# Patient Record
Sex: FEMALE
[De-identification: names, ages, dates, MRNs, and addresses within clinical notes are randomized per-mention and may not be internally consistent; named-entity substitution may affect disease eponyms.]

---

## 2020-06-29 ENCOUNTER — NURSE TRIAGE (OUTPATIENT)
Dept: OTHER | Facility: CLINIC | Age: 55
End: 2020-06-29

## 2020-06-29 NOTE — TELEPHONE ENCOUNTER
Reason for Disposition   Caller has URGENT medication question about med that PCP prescribed and triager unable to answer question    Answer Assessment - Initial Assessment Questions  1. SYMPTOMS: \"Do you have any symptoms? \"      no  2. SEVERITY: If symptoms are present, ask \"Are they mild, moderate or severe? \"      Not yet    Protocols used: MEDICATION QUESTION CALL-ADULT-[place]    Caller took dogs Apoquel by accident. I transferred to [facility]. [phone]. Caller had no further questions.

## 2023-03-07 ENCOUNTER — TELEPHONE (OUTPATIENT)
Dept: PRIMARY CARE | Facility: CLINIC | Age: 58
End: 2023-03-07
Payer: COMMERCIAL

## 2023-03-07 LAB
ALANINE AMINOTRANSFERASE (SGPT) (U/L) IN SER/PLAS: 23 U/L (ref 7–45)
ALBUMIN (G/DL) IN SER/PLAS: 4.6 G/DL (ref 3.4–5)
ALKALINE PHOSPHATASE (U/L) IN SER/PLAS: 68 U/L (ref 33–110)
ANION GAP IN SER/PLAS: 8 MMOL/L (ref 10–20)
ASPARTATE AMINOTRANSFERASE (SGOT) (U/L) IN SER/PLAS: 23 U/L (ref 9–39)
BASOPHILS (10*3/UL) IN BLOOD BY AUTOMATED COUNT: 0.04 X10E9/L (ref 0–0.1)
BASOPHILS/100 LEUKOCYTES IN BLOOD BY AUTOMATED COUNT: 0.6 % (ref 0–2)
BILIRUBIN TOTAL (MG/DL) IN SER/PLAS: 1 MG/DL (ref 0–1.2)
CALCIUM (MG/DL) IN SER/PLAS: 9.6 MG/DL (ref 8.6–10.3)
CARBON DIOXIDE, TOTAL (MMOL/L) IN SER/PLAS: 30 MMOL/L (ref 21–32)
CHLORIDE (MMOL/L) IN SER/PLAS: 104 MMOL/L (ref 98–107)
CHOLESTEROL (MG/DL) IN SER/PLAS: 213 MG/DL (ref 0–199)
CHOLESTEROL IN HDL (MG/DL) IN SER/PLAS: 39.7 MG/DL
CHOLESTEROL/HDL RATIO: 5.4
CREATININE (MG/DL) IN SER/PLAS: 0.83 MG/DL (ref 0.5–1.05)
ERYTHROCYTE DISTRIBUTION WIDTH (RATIO) BY AUTOMATED COUNT: 13.2 % (ref 11.5–14.5)
ERYTHROCYTE MEAN CORPUSCULAR HEMOGLOBIN CONCENTRATION (G/DL) BY AUTOMATED: 32.4 G/DL (ref 32–36)
ERYTHROCYTE MEAN CORPUSCULAR VOLUME (FL) BY AUTOMATED COUNT: 89 FL (ref 80–100)
ERYTHROCYTES (10*6/UL) IN BLOOD BY AUTOMATED COUNT: 5.15 X10E12/L (ref 4–5.2)
GFR FEMALE: 82 ML/MIN/1.73M2
GLUCOSE (MG/DL) IN SER/PLAS: 84 MG/DL (ref 74–99)
HEMATOCRIT (%) IN BLOOD BY AUTOMATED COUNT: 45.7 % (ref 36–46)
HEMOGLOBIN (G/DL) IN BLOOD: 14.8 G/DL (ref 12–16)
IMMATURE GRANULOCYTES/100 LEUKOCYTES IN BLOOD BY AUTOMATED COUNT: 0.3 % (ref 0–0.9)
LDL: 136 MG/DL (ref 0–99)
LEUKOCYTES (10*3/UL) IN BLOOD BY AUTOMATED COUNT: 7.1 X10E9/L (ref 4.4–11.3)
LYMPHOCYTES (10*3/UL) IN BLOOD BY AUTOMATED COUNT: 1.28 X10E9/L (ref 1.2–4.8)
LYMPHOCYTES/100 LEUKOCYTES IN BLOOD BY AUTOMATED COUNT: 18 % (ref 13–44)
MONOCYTES (10*3/UL) IN BLOOD BY AUTOMATED COUNT: 0.51 X10E9/L (ref 0.1–1)
MONOCYTES/100 LEUKOCYTES IN BLOOD BY AUTOMATED COUNT: 7.2 % (ref 2–10)
NEUTROPHILS (10*3/UL) IN BLOOD BY AUTOMATED COUNT: 5.28 X10E9/L (ref 1.2–7.7)
NEUTROPHILS/100 LEUKOCYTES IN BLOOD BY AUTOMATED COUNT: 73.9 % (ref 40–80)
PLATELETS (10*3/UL) IN BLOOD AUTOMATED COUNT: 360 X10E9/L (ref 150–450)
POTASSIUM (MMOL/L) IN SER/PLAS: 4.3 MMOL/L (ref 3.5–5.3)
PROTEIN TOTAL: 7.5 G/DL (ref 6.4–8.2)
SODIUM (MMOL/L) IN SER/PLAS: 138 MMOL/L (ref 136–145)
TRIGLYCERIDE (MG/DL) IN SER/PLAS: 185 MG/DL (ref 0–149)
UREA NITROGEN (MG/DL) IN SER/PLAS: 14 MG/DL (ref 6–23)
VLDL: 37 MG/DL (ref 0–40)

## 2023-03-07 NOTE — TELEPHONE ENCOUNTER
----- Message from Luis Chanel MD sent at 3/7/2023  1:31 PM EST -----  Please let patient know that her blood work showed her cholesterol was worse than last time.  Otherwise everything else looked normal.  If she could try to eat a lower fat diet and eat less processed foods therapy  ----- Message -----  From: [facility] - Lab Results In  Sent: 3/7/2023  12:25 PM EST  To: Luis Chanel MD

## 2023-07-17 PROBLEM — R87.612 LOW GRADE SQUAMOUS INTRAEPITHELIAL LESION (LGSIL) AT RISK FOR HIGH GRADE SQUAMOUS INTRAEPITHELIAL LESION (HGSIL) ON CYTOLOGIC SMEAR OF CERVIX: Status: ACTIVE | Noted: 2023-07-17

## 2023-07-19 ENCOUNTER — LAB (OUTPATIENT)
Dept: LAB | Facility: LAB | Age: 58
End: 2023-07-19
Payer: COMMERCIAL

## 2023-07-19 ENCOUNTER — OFFICE VISIT (OUTPATIENT)
Dept: PRIMARY CARE | Facility: CLINIC | Age: 58
End: 2023-07-19
Payer: COMMERCIAL

## 2023-07-19 VITALS
SYSTOLIC BLOOD PRESSURE: 122 MMHG | TEMPERATURE: 97.6 F | BODY MASS INDEX: 34.95 KG/M2 | WEIGHT: 210 LBS | DIASTOLIC BLOOD PRESSURE: 74 MMHG | HEART RATE: 73 BPM | OXYGEN SATURATION: 98 %

## 2023-07-19 DIAGNOSIS — R59.0 LOCALIZED ENLARGED LYMPH NODES: ICD-10-CM

## 2023-07-19 DIAGNOSIS — R53.83 FATIGUE, UNSPECIFIED TYPE: ICD-10-CM

## 2023-07-19 DIAGNOSIS — Z72.820 SLEEP DEFICIENT: ICD-10-CM

## 2023-07-19 DIAGNOSIS — R53.83 FATIGUE, UNSPECIFIED TYPE: Primary | ICD-10-CM

## 2023-07-19 PROBLEM — L65.9 ALOPECIA: Status: ACTIVE | Noted: 2022-09-07

## 2023-07-19 PROBLEM — E66.9 OBESITY: Status: ACTIVE | Noted: 2022-09-07

## 2023-07-19 LAB
ALANINE AMINOTRANSFERASE (SGPT) (U/L) IN SER/PLAS: 26 U/L (ref 7–45)
ALBUMIN (G/DL) IN SER/PLAS: 4.5 G/DL (ref 3.4–5)
ALKALINE PHOSPHATASE (U/L) IN SER/PLAS: 79 U/L (ref 33–110)
ANION GAP IN SER/PLAS: 11 MMOL/L (ref 10–20)
ASPARTATE AMINOTRANSFERASE (SGOT) (U/L) IN SER/PLAS: 24 U/L (ref 9–39)
BILIRUBIN TOTAL (MG/DL) IN SER/PLAS: 0.7 MG/DL (ref 0–1.2)
CALCIUM (MG/DL) IN SER/PLAS: 9.5 MG/DL (ref 8.6–10.3)
CARBON DIOXIDE, TOTAL (MMOL/L) IN SER/PLAS: 28 MMOL/L (ref 21–32)
CHLORIDE (MMOL/L) IN SER/PLAS: 104 MMOL/L (ref 98–107)
COBALAMIN (VITAMIN B12) (PG/ML) IN SER/PLAS: 315 PG/ML (ref 211–911)
CREATININE (MG/DL) IN SER/PLAS: 0.76 MG/DL (ref 0.5–1.05)
ERYTHROCYTE DISTRIBUTION WIDTH (RATIO) BY AUTOMATED COUNT: 13.4 % (ref 11.5–14.5)
ERYTHROCYTE MEAN CORPUSCULAR HEMOGLOBIN CONCENTRATION (G/DL) BY AUTOMATED: 33.1 G/DL (ref 32–36)
ERYTHROCYTE MEAN CORPUSCULAR VOLUME (FL) BY AUTOMATED COUNT: 89 FL (ref 80–100)
ERYTHROCYTES (10*6/UL) IN BLOOD BY AUTOMATED COUNT: 4.93 X10E12/L (ref 4–5.2)
FERRITIN (UG/LL) IN SER/PLAS: 93 UG/L (ref 8–150)
GFR FEMALE: >90 ML/MIN/1.73M2
GLUCOSE (MG/DL) IN SER/PLAS: 85 MG/DL (ref 74–99)
HEMATOCRIT (%) IN BLOOD BY AUTOMATED COUNT: 44.1 % (ref 36–46)
HEMOGLOBIN (G/DL) IN BLOOD: 14.6 G/DL (ref 12–16)
IRON (UG/DL) IN SER/PLAS: 97 UG/DL (ref 35–150)
IRON BINDING CAPACITY (UG/DL) IN SER/PLAS: 412 UG/DL (ref 240–445)
IRON SATURATION (%) IN SER/PLAS: 24 % (ref 25–45)
LEUKOCYTES (10*3/UL) IN BLOOD BY AUTOMATED COUNT: 8 X10E9/L (ref 4.4–11.3)
PLATELETS (10*3/UL) IN BLOOD AUTOMATED COUNT: 396 X10E9/L (ref 150–450)
POTASSIUM (MMOL/L) IN SER/PLAS: 4.5 MMOL/L (ref 3.5–5.3)
PROTEIN TOTAL: 7.1 G/DL (ref 6.4–8.2)
SODIUM (MMOL/L) IN SER/PLAS: 138 MMOL/L (ref 136–145)
THYROTROPIN (MIU/L) IN SER/PLAS BY DETECTION LIMIT <= 0.05 MIU/L: 1.06 MIU/L (ref 0.44–3.98)
UREA NITROGEN (MG/DL) IN SER/PLAS: 18 MG/DL (ref 6–23)

## 2023-07-19 PROCEDURE — 36415 COLL VENOUS BLD VENIPUNCTURE: CPT

## 2023-07-19 PROCEDURE — 1036F TOBACCO NON-USER: CPT | Performed by: FAMILY MEDICINE

## 2023-07-19 PROCEDURE — 82607 VITAMIN B-12: CPT

## 2023-07-19 PROCEDURE — 83550 IRON BINDING TEST: CPT

## 2023-07-19 PROCEDURE — 99214 OFFICE O/P EST MOD 30 MIN: CPT | Performed by: FAMILY MEDICINE

## 2023-07-19 PROCEDURE — 82652 VIT D 1 25-DIHYDROXY: CPT

## 2023-07-19 PROCEDURE — 82728 ASSAY OF FERRITIN: CPT

## 2023-07-19 PROCEDURE — 80053 COMPREHEN METABOLIC PANEL: CPT

## 2023-07-19 PROCEDURE — 85027 COMPLETE CBC AUTOMATED: CPT

## 2023-07-19 PROCEDURE — 83540 ASSAY OF IRON: CPT

## 2023-07-19 PROCEDURE — 84443 ASSAY THYROID STIM HORMONE: CPT

## 2023-07-19 RX ORDER — CALCIUM CARBONATE/VITAMIN D3 600 MG-10
1 TABLET ORAL DAILY
COMMUNITY

## 2023-07-19 RX ORDER — SPIRONOLACTONE 50 MG/1
TABLET, FILM COATED ORAL
COMMUNITY
Start: 2016-06-29

## 2023-07-19 RX ORDER — FLUTICASONE PROPIONATE 50 MCG
SPRAY, SUSPENSION (ML) NASAL
COMMUNITY
Start: 2022-02-08

## 2023-07-19 RX ORDER — MINOXIDIL 50 MG/G
AEROSOL, FOAM TOPICAL
COMMUNITY

## 2023-07-19 ASSESSMENT — ENCOUNTER SYMPTOMS
SHORTNESS OF BREATH: 0
CHILLS: 0
FEVER: 0
DYSURIA: 0
COUGH: 0
FATIGUE: 1

## 2023-07-19 NOTE — PROGRESS NOTES
Subjective   Patient ID: Bria Pedro is a 58 y.o. female who presents for Fatigue (Extreme tiredness x 3 months).    Bria presents to discuss fatigue and abnormal findings on the chest CT.    She reports persistent fatigue over the last few months.  She did have removal of both her ovaries in April.  Feels tired most days.  Is sleeping about 5 hours per night.  Energy levels are lower than normal.    Recently she had a CT of her chest which showed enlarged lymph nodes in the mediastinal area.  She is not having any symptoms of cough, shortness of breath, or chest pain.  Was recommended to see hematology by the lung specialist however she has not made the appointment yet.           Review of Systems   Constitutional:  Positive for fatigue. Negative for chills and fever.   Respiratory:  Negative for cough and shortness of breath.    Cardiovascular:  Negative for chest pain.   Genitourinary:  Negative for dysuria.       Objective   /74   Pulse 73   Temp 36.4 °C (97.6 °F)   Wt 95.3 kg (210 lb)   SpO2 98%   BMI 34.95 kg/m²     Physical Exam  Constitutional:       General: She is not in acute distress.     Appearance: Normal appearance.   HENT:      Head: Normocephalic.   Pulmonary:      Effort: Pulmonary effort is normal.   Neurological:      General: No focal deficit present.      Mental Status: She is alert.   Psychiatric:         Mood and Affect: Mood normal.         Assessment/Plan   Diagnoses and all orders for this visit:  Fatigue, unspecified type  Comments:  Possibly related to sleep deficiency.  Discussed changing sleep behaviorto get 7-9 hours of sleep per night.  Check labs to assess for underlying cause.  Orders:  -     CBC; Future  -     Comprehensive Metabolic Panel; Future  -     Ferritin; Future  -     Iron and TIBC; Future  -     TSH with reflex to Free T4 if abnormal; Future  -     Vitamin B12; Future  -     Vitamin D 1,25 Dihydroxy; Future  Sleep deficient  Localized enlarged lymph  nodes  Comments:  Mediastinal lymph nodes are stable on CT.  Referred to hematology oncology to see if further evaluation is warranted and recommended follow-up.  Orders:  -     Referral to Hematology; Future

## 2023-07-22 LAB — VITAMIN D 1,25-DIHYDROXY: 89.2 PG/ML (ref 19.9–79.3)

## 2023-07-28 ENCOUNTER — TELEPHONE (OUTPATIENT)
Dept: PRIMARY CARE | Facility: CLINIC | Age: 58
End: 2023-07-28
Payer: COMMERCIAL

## 2023-10-17 ENCOUNTER — NUTRITION (OUTPATIENT)
Dept: NUTRITION | Facility: CLINIC | Age: 58
End: 2023-10-17
Payer: COMMERCIAL

## 2023-10-17 DIAGNOSIS — E66.9 OBESITY, UNSPECIFIED CLASSIFICATION, UNSPECIFIED OBESITY TYPE, UNSPECIFIED WHETHER SERIOUS COMORBIDITY PRESENT: Primary | ICD-10-CM

## 2023-10-17 PROCEDURE — 97802 MEDICAL NUTRITION INDIV IN: CPT

## 2023-10-17 NOTE — PROGRESS NOTES
NUTRITION ASSESSMENT NOTE    Reason for Nutrition Visit:  Pt is a 58 y.o. female being seen in person for an initial appointment at [facility] referred for obesity and BMI of 34.0-34.9. Pt states they seek  from dietitian for help losing wt. Pt realizes that the things she used to do to lose and manage wt status are no longer working for her.     Anthropometrics:  Today's wt: 206#  IBW: 125#/56.8kg  % IBW: 164%  Significant wt change: No      Past Medical Hx:  Patient Active Problem List   Diagnosis    Low grade squamous intraepithelial lesion (LGSIL) at risk for high grade squamous intraepithelial lesion (HGSIL) on cytologic smear of cervix    Alopecia    Obesity          Lab Results   Component Value Date    CHOL 213 (H) 03/07/2023    LDLF 136 (H) 03/07/2023    TRIG 185 (H) 03/07/2023          Food and Nutrition Hx:  Pt does not eat a lot of red meat, however she does consume a great deal of cheese - consumption daily in nature.      DIETARY RECALL:  Wake-up: 5:30am  Meal 1: Everyday, ~7am, weekdays - smoothie with protein powder/spinach x 2 handfuls/blueberries x 1 C/flaxseed x 1 tbsp; weekends - scrambled eggs, omelette with vegetables, oatmeal with blueberries/banana/walnuts  Meal 2: Everyday, ~12:30pm, salads, leftovers, black bean burger, tuna fish, chicken  Meal 3: Everyday, ~6pm/7:30pm, chicken (roasted), soup (chicken tortilla), roasted pork loin, turkey stuffed peppers with mashed potatoes, baked potatoes   Snacks: Tortilla chips with cheese and salsa  Bedtime: ~11pm  Beverages: Water x ~60+oz daily, carbonated water, almond milk, unsweetened iced tea      NUTRITIONAL ARTIFACTS:  Pt does not consume caffeine -- pt was gluten free x 1 year a few years ago, she felt great -- owns and uses a [employer] -- belongs to [employer] and uses for groceries -- does not drink pops/sodas    Allergies: None  Intolerance: None  Appetite: Normal  Intake: >75%  GI Symptoms : constipation Frequency: occasional  Swallowing  Difficulty: No problems with swallowing  Dentition : own    Types of Activities: HIT training  Duration: 1-2 hours every 2 days    Sleep duration/quality : 5 hours, not continuous or quality in nature  Sleep disorders: none    Supplements: Multivitamin and Fish Oil daily    Feelings of Hunger?: Doesn't notice - Pt does eat out of habit, but asks herself if she is really hungry  Physical Feeling: Stuffed  Cravings: Sweet  Energy Levels: Stable    Food Preparation: Patient  Pt likes to cook, feels skilled   Grocery Shopping: Patient    Eating Out Type: Lunch       Nutrition Focused Physical Exam:    Performed/Deferred: Deferred as pt visually appears well-nourished with no signs of malnutrition      Estimated Energy Needs:    WEIGHT MAINTENANCE Needs: MSJ, 1526 x 1.3 (AF) - 2000 kcals/day  WEIGHT LOSS Needs: 25-30 kcals/kg/IBW = 0723-9998 kcals/day  Protein Needs: 0.8-1 g/kg/CBW = 75-95 g/protein/day      Nutrition Diagnosis:    Diagnosis Statement 1:  Diagnosis Status: New  Diagnosis : Inadequate fiber intake  related to food and nutrition related knowledge deficit concerning desirable quantities of fiber as evidenced by estimated intake of fiber that is insufficient when compared to recommended amounts (38 g/day for men and 25 g/day for women)    Diagnosis Statement 2:  Diagnosis Status: New  Diagnosis : Altered nutrition related lab values  related to  endocrine and/or metabolic dysfunction  as evidenced by  total cholesterol of 213, LDL of 136, and TG of 185    Diagnosis Statement 3:   Diagnosis Status: New  Diagnosis : Food and nutrition related knowledge deficit related to lack of or limited prior nutrition-related education as evidenced by verbalizes inaccurate or incomplete knowledge    Nutrition Interventions:  Decreased Carbohydrate Diet, Decreased Fat Diet, Increased Fiber Diet, Increased Soluble Fiber, Increased Omega-3 Diet, Increased Protein Diet, Low Saturated Fat Diet, Mediterranean Diet, and Plant  Based Diet  Nutrition Counseling: Motivational Interviewing and Goal Setting  Coordination of Care: None    Nutrition Goals:  Nutrition Goals: Adequate fluid intake: 80oz+ water daily  Carbohydrate consistency  Consistent meal/snack pattern  Decrease intake of added sugars  Decrease intake of saturated fats  Increase awareness and respond to satiety cues  Lab values within normal limits  Reduce LDL level  Reduce Triglyceride level  Weight Loss  Vegetables: Increase  Whole Grains: Increase  Seafood/fish: Increase    Nutrition Recommendations:  1) Consider following a Mediterranean approach to your dietary pattern to help improve health and reduce risk/manage chronic disease. The Mediterranean dietary pattern focuses on eating more unprocessed foods with frequent and regular consumption of fresh fruits, fresh vegetables, whole grains, legumes/beans, nuts, fish and seafood, poultry, eggs, low fat cheese and dairy (cottage cheese, Greek yogurt, and kefir), and use heart healthy oils like cold-pressed extra-virgin olive oil and minimally refined avocado oil. Include variety and color to match the rainbow within your diet by opting for fresh or frozen varieties of fruits and vegetables that you can regularly include at your meals and snacks.  2) Eat more fresh or canned fish, shellfish, and mollusks including shellfish, salmon, tuna, sardines, trout, anchovies, halibut, Susan Susan, perch, walleye, sea bass, herring, shrimp, crab, oysters, mussels, and clams several times a week. These contain beneficial omega-3 fatty acids, protein, iron, and other vitamins and minerals while boasting low saturated fat content.  3) Choose more whole grains for their added fiber and nutrient content. Whole grains include 100% whole wheat products, amaranth, barley, buckwheat, bulgur, einkorn, farro, fonio, freekah, kamut, kaniwa, millet, oats, quinoa, rice (brown, wild, black, red), rye, sorghum, spelt, and teff.   4) Increase fibrous  vegetable intake. Women are recommended to consume at least 25 grams of fiber daily. Plants, such as vegetables, fruits, whole grains, legumes, nuts, and seeds will help reduce inflammation in your GI tract, help repair and restore lining and integrity of GI tract, manage blood sugar levels, reduce cholesterol and lipid levels, enhance metabolic functioning, improve weight status, and promote satiety. Fiber from plants does not count as calories, and is considered extremely healthful.    Educational Handouts: ADA My Plate, Plant-Based Plate, Whole Grains A-Z, ACLM Nourish Bowls, DGDD, 30DMDMP    Readiness to Change : Good  Level of Understanding : Good  Anticipated Compliant : Good

## 2023-12-12 ENCOUNTER — APPOINTMENT (OUTPATIENT)
Dept: NUTRITION | Facility: CLINIC | Age: 58
End: 2023-12-12
Payer: COMMERCIAL

## 2024-05-07 ENCOUNTER — OFFICE VISIT (OUTPATIENT)
Dept: PRIMARY CARE | Facility: CLINIC | Age: 59
End: 2024-05-07
Payer: COMMERCIAL

## 2024-05-07 VITALS
DIASTOLIC BLOOD PRESSURE: 84 MMHG | BODY MASS INDEX: 35.49 KG/M2 | HEART RATE: 88 BPM | WEIGHT: 213 LBS | HEIGHT: 65 IN | OXYGEN SATURATION: 98 % | SYSTOLIC BLOOD PRESSURE: 122 MMHG | TEMPERATURE: 98.4 F

## 2024-05-07 DIAGNOSIS — Z00.00 ROUTINE ADULT HEALTH MAINTENANCE: Primary | ICD-10-CM

## 2024-05-07 DIAGNOSIS — Z12.11 SCREENING FOR MALIGNANT NEOPLASM OF COLON: ICD-10-CM

## 2024-05-07 PROBLEM — E78.5 HYPERLIPIDEMIA: Status: ACTIVE | Noted: 2024-05-07

## 2024-05-07 PROBLEM — F41.9 ANXIETY: Status: ACTIVE | Noted: 2024-05-07

## 2024-05-07 PROBLEM — Z86.010 HX OF COLONIC POLYP: Status: ACTIVE | Noted: 2018-09-19

## 2024-05-07 PROBLEM — Z86.0100 HX OF COLONIC POLYP: Status: ACTIVE | Noted: 2018-09-19

## 2024-05-07 PROBLEM — E04.1 THYROID NODULE: Status: ACTIVE | Noted: 2024-05-07

## 2024-05-07 PROCEDURE — 1036F TOBACCO NON-USER: CPT | Performed by: FAMILY MEDICINE

## 2024-05-07 PROCEDURE — 3008F BODY MASS INDEX DOCD: CPT | Performed by: FAMILY MEDICINE

## 2024-05-07 PROCEDURE — 99396 PREV VISIT EST AGE 40-64: CPT | Performed by: FAMILY MEDICINE

## 2024-05-07 RX ORDER — BISMUTH SUBSALICYLATE 262 MG
1 TABLET,CHEWABLE ORAL DAILY
COMMUNITY

## 2024-05-07 RX ORDER — FEXOFENADINE HCL 60 MG
60 TABLET ORAL DAILY
COMMUNITY

## 2024-05-07 ASSESSMENT — PATIENT HEALTH QUESTIONNAIRE - PHQ9
1. LITTLE INTEREST OR PLEASURE IN DOING THINGS: NOT AT ALL
SUM OF ALL RESPONSES TO PHQ9 QUESTIONS 1 AND 2: 0
2. FEELING DOWN, DEPRESSED OR HOPELESS: NOT AT ALL

## 2024-05-07 NOTE — PROGRESS NOTES
Subjective   Patient ID: Bria Pedro is a 58 y.o. female who presents for Annual Exam.  HPI patient reports that she is doing well.  She is studying for exam for her work which is stressing her out some.  Her weight has been stable although she is been try lose weight and is frustrated by it.  She does not want to take any medications for weight loss.  She is due for colonoscopy and needs another CT scan of her lung ordered.  She had mammograms done earlier in the year.    Patient Active Problem List   Diagnosis    Low grade squamous intraepithelial lesion (LGSIL) at risk for high grade squamous intraepithelial lesion (HGSIL) on cytologic smear of cervix    Alopecia    Obesity    Anxiety    Hyperlipidemia    Hx of colonic polyp    Thyroid nodule       Social Connections: Not on file       Current Outpatient Medications on File Prior to Visit   Medication Sig Dispense Refill    fexofenadine (Allegra) 60 mg tablet Take 1 tablet (60 mg) by mouth once daily.      fluticasone (Flonase) 50 mcg/actuation nasal spray Administer into affected nostril(s).      minoxidiL 5 % foam APPLY TO HEAD DAILY      multivitamin tablet Take 1 tablet by mouth once daily.      omega-3 fatty acids-fish oil (One-Per-Day Omega-3) 684-1,200 mg capsule Take 1 capsule (1,200 mg) by mouth once daily.      spironolactone (Aldactone) 50 mg tablet Take by mouth.       No current facility-administered medications on file prior to visit.        Vitals:    05/07/24 1419   BP: 122/84   Pulse: 88   Temp: 36.9 °C (98.4 °F)   SpO2: 98%     Vitals:    05/07/24 1419   Weight: 96.6 kg (213 lb)       Review of Systems   All other systems reviewed and are negative.      Objective     Physical Exam  Vitals reviewed.   Constitutional:       General: She is not in acute distress.     Appearance: Normal appearance. She is well-developed. She is not diaphoretic.   HENT:      Head: Normocephalic and atraumatic.      Right Ear: Tympanic membrane normal.      Left  Ear: Tympanic membrane normal.      Nose: Nose normal.      Mouth/Throat:      Mouth: Mucous membranes are moist.   Eyes:      Pupils: Pupils are equal, round, and reactive to light.   Cardiovascular:      Rate and Rhythm: Normal rate and regular rhythm.      Heart sounds: Normal heart sounds. No murmur heard.     No friction rub. No gallop.   Pulmonary:      Effort: Pulmonary effort is normal.      Breath sounds: Normal breath sounds. No rales.   Abdominal:      General: Bowel sounds are normal.      Palpations: Abdomen is soft.      Tenderness: There is no abdominal tenderness.   Musculoskeletal:      Cervical back: Normal range of motion and neck supple.   Skin:     General: Skin is warm and dry.   Neurological:      Mental Status: She is alert.   Psychiatric:         Mood and Affect: Mood normal.         No visits with results within 2 Month(s) from this visit.   Latest known visit with results is:   Legacy Encounter on 08/03/2023   Component Date Value Ref Range Status    LD 08/03/2023 78 (L)  84 - 246 U/L Final    Glucose 08/03/2023 85  74 - 99 mg/dL Final    Sodium 08/03/2023 140  136 - 145 mmol/L Final    Potassium 08/03/2023 4.0  3.5 - 5.3 mmol/L Final    Chloride 08/03/2023 106  98 - 107 mmol/L Final    Bicarbonate 08/03/2023 27  21 - 32 mmol/L Final    Anion Gap 08/03/2023 11  10 - 20 mmol/L Final    Urea Nitrogen 08/03/2023 15  6 - 23 mg/dL Final    Creatinine 08/03/2023 0.77  0.50 - 1.05 mg/dL Final    GFR Female 08/03/2023 90  >90 mL/min/1.73m2 Final    Comment:  CALCULATIONS OF ESTIMATED GFR ARE PERFORMED   USING THE 2021 CKD-EPI STUDY REFIT EQUATION   WITHOUT THE RACE VARIABLE FOR THE IDMS-TRACEABLE   CREATININE METHODS.    https://jasn.asnjournals.org/content/early/2021/09/22/ASN.7324206691      Calcium 08/03/2023 9.5  8.6 - 10.3 mg/dL Final    Albumin 08/03/2023 4.5  3.4 - 5.0 g/dL Final    Alkaline Phosphatase 08/03/2023 73  33 - 110 U/L Final    Total Protein 08/03/2023 7.2  6.4 - 8.2 g/dL Final     AST 08/03/2023 27  9 - 39 U/L Final    Total Bilirubin 08/03/2023 0.6  0.0 - 1.2 mg/dL Final    ALT (SGPT) 08/03/2023 24  7 - 45 U/L Final    Comment:  Patients treated with Sulfasalazine may generate    falsely decreased results for ALT.      WBC 08/03/2023 6.9  4.4 - 11.3 x10E9/L Final    RBC 08/03/2023 4.79  4.00 - 5.20 x10E12/L Final    Hemoglobin 08/03/2023 14.3  12.0 - 16.0 g/dL Final    Hematocrit 08/03/2023 42.2  36.0 - 46.0 % Final    MCV 08/03/2023 88  80 - 100 fL Final    MCHC 08/03/2023 33.9  32.0 - 36.0 g/dL Final    Platelets 08/03/2023 322  150 - 450 x10E9/L Final    RDW 08/03/2023 13.2  11.5 - 14.5 % Final    Neutrophils % 08/03/2023 75.4  40.0 - 80.0 % Final    Immature Granulocytes %, Automated 08/03/2023 0.3  0.0 - 0.9 % Final    Comment:  Immature Granulocyte Count (IG) includes promyelocytes,    myelocytes and metamyelocytes but does not include bands.   Percent differential counts (%) should be interpreted in the   context of the absolute cell counts (cells/L).      Lymphocytes % 08/03/2023 17.1  13.0 - 44.0 % Final    Monocytes % 08/03/2023 6.6  2.0 - 10.0 % Final    Basophils % 08/03/2023 0.6  0.0 - 2.0 % Final    Neutrophils Absolute 08/03/2023 5.23  1.20 - 7.70 x10E9/L Final    Lymphocytes Absolute 08/03/2023 1.19 (L)  1.20 - 4.80 x10E9/L Final    Monocytes Absolute 08/03/2023 0.46  0.10 - 1.00 x10E9/L Final    Basophils Absolute 08/03/2023 0.04  0.00 - 0.10 x10E9/L Final    Hepatitis B Core Total Ab 08/03/2023 NONREACTIVE  NONREACTIVE Final    Comment:  Results from patients taking biotin supplements or receiving   high-dose biotin therapy should be interpreted with caution   due to possible interference with this test. Providers may   contact their local laboratory for further information.      Protime 08/03/2023 10.8  9.8 - 12.8 sec Final    Note new reference range as of 6/20/2023 at 10:00am.    INR 08/03/2023 1.0  0.9 - 1.1 Final    aPTT 08/03/2023 33  27 - 38 sec Final    Note new  reference range as of 6/20/2023 at 10:00am.    Hepatitis B Surface Ag 08/03/2023 NONREACTIVE  NONREACTIVE Final    Comment:  Biotin interference may cause falsely decreased results.   Patients taking a Biotin dose of up to 5 mg/day should   refrain from taking Biotin for 24 hours before sample   collection. Providers may contact their local laboratory   for further information.      HIV 1 and 2 Screen 08/03/2023 NONREACTIVE  NONREACTIVE Final    Comment:  HIV Ag/Ab screen is performed using the Siemens BioSilta   HIV Ag/Ab Combo assay which detects the presence of HIV    p24 antigen as well as antibodies to HIV-1   (Group M and O) and HIV-2.  .  No laboratory evidence of HIV infection. If acute HIV infection is   suspected, consider testing for HIV RNA by PCR (viral load).      EBV PCR, Quant, Whole Blood 08/03/2023 Not Detected  IU/mL Final    Comment:  To convert IU/mL to copies/mL multiply result by 0.49.   REF VALUE  NOT DETECTED      EBV PCR Whole Blood LOG IU/mL 08/03/2023 Not Calculated  Log IU/mL Final    Comment:  Reportable Range: 500-5,000,000 IU/mL.    Please note: the testing methodology of the EBV VIRUS     DNA QUANTIFICATION has been changed. Laboratory validation    shows a good correlation between the results obtained from    the new methodology and those obtained from the current    testing performed in the reference laboratory. Should there    be any issues that need to be clarified, please contact the     [facility] at [phone].      Interpretation  The EBV VIRUS DNA Quantitative test is a  PCR assay targeting the Lmp2A gene using [employer]   ASR reagents. The analytical quantification range   of this assay has been determined to be 500 to   5,000,000 IU/ml in plasma or whole blood. The limit  of detection for this assay is 388 IU/ml. If the   assay DETECTED the presence of the virus but was not  able to accurately quantify the number of copies, the  test result will be  "reported as \"NOT QUANTIFIED\".   A negative result does not exclude the possibility   of EBV                            virus infection since very low levels of   infection or sampling error may cause a false   negative result.     This assay is intended for use in conjunction with  clinical presentation and other laboratory markers of  disease progression for the clinical management of EBV   infected patients.  This test was developed and its  performance characteristics determined by the   [facility], Department of Pathology,  [facility], [place], [place]. It   has not been cleared or approved by the US Food and Drug  Administration. The FDA has determined that such   clearance is not necessary. It should not be regarded as  investigational or for research use.          Assessment/Plan   Problem List Items Addressed This Visit    None  Visit Diagnoses         Codes    Routine adult health maintenance    -  Primary Z00.00    Relevant Orders    Lipid Panel    Comprehensive Metabolic Panel    CBC and Auto Differential    Hemoglobin A1C    Screening for malignant neoplasm of colon     Z12.11    Relevant Orders    Colonoscopy Screening; Average Risk Patient          Encourage patient to do 1200-calorie Mediterranean diet.  Also consider 24-hour fast once a week.  With water.  Ordered CT scan and colonoscopy.  Checking blood work.  "

## 2024-05-15 ENCOUNTER — LAB (OUTPATIENT)
Dept: LAB | Facility: LAB | Age: 59
End: 2024-05-15
Payer: COMMERCIAL

## 2024-05-15 DIAGNOSIS — Z00.00 ROUTINE ADULT HEALTH MAINTENANCE: ICD-10-CM

## 2024-05-15 LAB
BASOPHILS # BLD MANUAL: 0 X10*3/UL (ref 0–0.1)
BASOPHILS NFR BLD MANUAL: 0 %
EOSINOPHIL # BLD MANUAL: 0.31 X10*3/UL (ref 0–0.7)
EOSINOPHIL NFR BLD MANUAL: 4 %
ERYTHROCYTE [DISTWIDTH] IN BLOOD BY AUTOMATED COUNT: 13 % (ref 11.5–14.5)
EST. AVERAGE GLUCOSE BLD GHB EST-MCNC: 108 MG/DL
HBA1C MFR BLD: 5.4 %
HCT VFR BLD AUTO: 45 % (ref 36–46)
HGB BLD-MCNC: 14.9 G/DL (ref 12–16)
IMM GRANULOCYTES # BLD AUTO: 0.02 X10*3/UL (ref 0–0.7)
IMM GRANULOCYTES NFR BLD AUTO: 0.3 % (ref 0–0.9)
LYMPHOCYTES # BLD MANUAL: 1.25 X10*3/UL (ref 1.2–4.8)
LYMPHOCYTES NFR BLD MANUAL: 16 %
MCH RBC QN AUTO: 29.4 PG (ref 26–34)
MCHC RBC AUTO-ENTMCNC: 33.1 G/DL (ref 32–36)
MCV RBC AUTO: 89 FL (ref 80–100)
MONOCYTES # BLD MANUAL: 0.23 X10*3/UL (ref 0.1–1)
MONOCYTES NFR BLD MANUAL: 3 %
NEUTROPHILS # BLD MANUAL: 6.01 X10*3/UL (ref 1.2–7.7)
NEUTS BAND # BLD MANUAL: 0.16 X10*3/UL (ref 0–0.7)
NEUTS BAND NFR BLD MANUAL: 2 %
NEUTS SEG # BLD MANUAL: 5.85 X10*3/UL (ref 1.2–7)
NEUTS SEG NFR BLD MANUAL: 75 %
NRBC BLD-RTO: 0 /100 WBCS (ref 0–0)
PLATELET # BLD AUTO: 365 X10*3/UL (ref 150–450)
RBC # BLD AUTO: 5.06 X10*6/UL (ref 4–5.2)
RBC MORPH BLD: NORMAL
TOTAL CELLS COUNTED BLD: 100
WBC # BLD AUTO: 7.8 X10*3/UL (ref 4.4–11.3)

## 2024-05-15 PROCEDURE — 36415 COLL VENOUS BLD VENIPUNCTURE: CPT

## 2024-05-15 PROCEDURE — 83036 HEMOGLOBIN GLYCOSYLATED A1C: CPT

## 2024-05-15 PROCEDURE — 80061 LIPID PANEL: CPT

## 2024-05-15 PROCEDURE — 85027 COMPLETE CBC AUTOMATED: CPT

## 2024-05-15 PROCEDURE — 80053 COMPREHEN METABOLIC PANEL: CPT

## 2024-05-15 PROCEDURE — 85007 BL SMEAR W/DIFF WBC COUNT: CPT

## 2024-05-16 LAB
ALBUMIN SERPL BCP-MCNC: 4.6 G/DL (ref 3.4–5)
ALP SERPL-CCNC: 73 U/L (ref 33–110)
ALT SERPL W P-5'-P-CCNC: 21 U/L (ref 7–45)
ANION GAP SERPL CALC-SCNC: 21 MMOL/L (ref 10–20)
AST SERPL W P-5'-P-CCNC: 20 U/L (ref 9–39)
BILIRUB SERPL-MCNC: 0.8 MG/DL (ref 0–1.2)
BUN SERPL-MCNC: 18 MG/DL (ref 6–23)
CALCIUM SERPL-MCNC: 9.6 MG/DL (ref 8.6–10.6)
CHLORIDE SERPL-SCNC: 104 MMOL/L (ref 98–107)
CHOLEST SERPL-MCNC: 229 MG/DL (ref 0–199)
CHOLESTEROL/HDL RATIO: 5.4
CO2 SERPL-SCNC: 21 MMOL/L (ref 21–32)
CREAT SERPL-MCNC: 1.16 MG/DL (ref 0.5–1.05)
EGFRCR SERPLBLD CKD-EPI 2021: 55 ML/MIN/1.73M*2
GLUCOSE SERPL-MCNC: 82 MG/DL (ref 74–99)
HDLC SERPL-MCNC: 42.8 MG/DL
LDLC SERPL CALC-MCNC: 160 MG/DL
NON HDL CHOLESTEROL: 186 MG/DL (ref 0–149)
POTASSIUM SERPL-SCNC: 4.2 MMOL/L (ref 3.5–5.3)
PROT SERPL-MCNC: 7.3 G/DL (ref 6.4–8.2)
SODIUM SERPL-SCNC: 142 MMOL/L (ref 136–145)
TRIGL SERPL-MCNC: 133 MG/DL (ref 0–149)
VLDL: 27 MG/DL (ref 0–40)

## 2024-05-18 NOTE — RESULT ENCOUNTER NOTE
Pts BW looks like her kidneys are worse than they were a year ago.  Is she taking any anti-inflammatories?  Is she getting enough fluids?

## 2024-05-20 ENCOUNTER — TELEPHONE (OUTPATIENT)
Dept: PRIMARY CARE | Facility: CLINIC | Age: 59
End: 2024-05-20
Payer: COMMERCIAL

## 2024-05-20 NOTE — TELEPHONE ENCOUNTER
----- Message from Luis Chanel MD sent at 5/17/2024  9:53 PM EDT -----  Pts BW looks like her kidneys are worse than they were a year ago.  Is she taking any anti-inflammatories?  Is she getting enough fluids?

## 2024-06-05 ENCOUNTER — LAB (OUTPATIENT)
Dept: LAB | Facility: LAB | Age: 59
End: 2024-06-05
Payer: COMMERCIAL

## 2024-06-05 DIAGNOSIS — R53.83 OTHER FATIGUE: Primary | ICD-10-CM

## 2024-06-05 LAB
25(OH)D3 SERPL-MCNC: 34 NG/ML (ref 30–100)
FERRITIN SERPL-MCNC: 129 NG/ML (ref 8–150)
TSH SERPL-ACNC: 1.31 MIU/L (ref 0.44–3.98)

## 2024-06-05 PROCEDURE — 82728 ASSAY OF FERRITIN: CPT

## 2024-06-05 PROCEDURE — 82306 VITAMIN D 25 HYDROXY: CPT

## 2024-06-05 PROCEDURE — 36415 COLL VENOUS BLD VENIPUNCTURE: CPT

## 2024-06-05 PROCEDURE — 84443 ASSAY THYROID STIM HORMONE: CPT

## 2024-06-27 ENCOUNTER — TELEPHONE (OUTPATIENT)
Dept: GASTROENTEROLOGY | Facility: CLINIC | Age: 59
End: 2024-06-27
Payer: COMMERCIAL

## 2024-07-17 ENCOUNTER — TELEPHONE (OUTPATIENT)
Dept: PRIMARY CARE | Facility: CLINIC | Age: 59
End: 2024-07-17
Payer: COMMERCIAL

## 2024-07-17 ENCOUNTER — APPOINTMENT (OUTPATIENT)
Dept: RADIOLOGY | Facility: HOSPITAL | Age: 59
End: 2024-07-17
Payer: COMMERCIAL

## 2024-07-17 DIAGNOSIS — R59.0 LOCALIZED ENLARGED LYMPH NODES: Primary | ICD-10-CM

## 2024-07-17 DIAGNOSIS — R91.1 LUNG NODULE: ICD-10-CM

## 2024-07-30 ENCOUNTER — APPOINTMENT (OUTPATIENT)
Dept: PRIMARY CARE | Facility: CLINIC | Age: 59
End: 2024-07-30
Payer: COMMERCIAL

## 2024-07-30 VITALS
OXYGEN SATURATION: 97 % | TEMPERATURE: 97 F | WEIGHT: 211 LBS | HEART RATE: 72 BPM | DIASTOLIC BLOOD PRESSURE: 76 MMHG | BODY MASS INDEX: 35.11 KG/M2 | SYSTOLIC BLOOD PRESSURE: 124 MMHG

## 2024-07-30 DIAGNOSIS — M25.561 RECURRENT PAIN OF RIGHT KNEE: Primary | ICD-10-CM

## 2024-07-30 PROCEDURE — 1036F TOBACCO NON-USER: CPT | Performed by: FAMILY MEDICINE

## 2024-07-30 PROCEDURE — 99213 OFFICE O/P EST LOW 20 MIN: CPT | Performed by: FAMILY MEDICINE

## 2024-07-30 NOTE — PROGRESS NOTES
"Subjective   Patient ID: Bria Pedro is a 59 y.o. female who presents for Knee Pain (Back of R knee pain, \"popping\" x 2 months. NKI).  Knee Pain      patient presents because she has been having pain in the back of her right knee for 2 months.  Sometimes it swells up sometimes at has sensation of popping.  It typically happens more when she has twisted her knee.    Patient Active Problem List   Diagnosis    Low grade squamous intraepithelial lesion (LGSIL) at risk for high grade squamous intraepithelial lesion (HGSIL) on cytologic smear of cervix    Alopecia    Obesity    Anxiety    Hyperlipidemia    Hx of colonic polyp    Thyroid nodule       Social Connections: Not on file       Current Outpatient Medications on File Prior to Visit   Medication Sig Dispense Refill    fexofenadine (Allegra) 60 mg tablet Take 1 tablet (60 mg) by mouth once daily.      fluticasone (Flonase) 50 mcg/actuation nasal spray Administer into affected nostril(s).      minoxidiL 5 % foam APPLY TO HEAD DAILY      multivitamin tablet Take 1 tablet by mouth once daily.      omega-3 fatty acids-fish oil (One-Per-Day Omega-3) 684-1,200 mg capsule Take 1 capsule (1,200 mg) by mouth once daily.      spironolactone (Aldactone) 50 mg tablet Take by mouth.       No current facility-administered medications on file prior to visit.        Vitals:    07/30/24 1112   BP: 124/76   Pulse: 72   Temp: 36.1 °C (97 °F)   SpO2: 97%     Vitals:    07/30/24 1112   Weight: 95.7 kg (211 lb)       Review of Systems   All other systems reviewed and are negative.      Objective     Physical Exam  Musculoskeletal:      Comments: Mild prominence to back R knee.  Normal knee exam.         Lab on 06/05/2024   Component Date Value Ref Range Status    Vitamin D, 25-Hydroxy, Total 06/05/2024 34  30 - 100 ng/mL Final    Thyroid Stimulating Hormone 06/05/2024 1.31  0.44 - 3.98 mIU/L Final    Ferritin 06/05/2024 129  8 - 150 ng/mL Final       Assessment/Plan   Problem List " Items Addressed This Visit    None  Visit Diagnoses         Codes    Recurrent pain of right knee    -  Primary M25.561          Try to do knee rehab.  Continue with weight loss efforts.  Try curcumin.

## 2024-07-31 ENCOUNTER — TELEPHONE (OUTPATIENT)
Dept: PRIMARY CARE | Facility: CLINIC | Age: 59
End: 2024-07-31
Payer: COMMERCIAL

## 2024-07-31 ENCOUNTER — HOSPITAL ENCOUNTER (OUTPATIENT)
Dept: RADIOLOGY | Facility: HOSPITAL | Age: 59
Discharge: HOME | End: 2024-07-31
Payer: COMMERCIAL

## 2024-07-31 DIAGNOSIS — R91.1 LUNG NODULE: ICD-10-CM

## 2024-07-31 DIAGNOSIS — R59.0 LOCALIZED ENLARGED LYMPH NODES: ICD-10-CM

## 2024-07-31 PROCEDURE — 71250 CT THORAX DX C-: CPT

## 2024-08-06 DIAGNOSIS — R91.8 MULTIPLE LUNG NODULES ON CT: Primary | ICD-10-CM

## 2024-08-06 NOTE — TELEPHONE ENCOUNTER
Spoke with Bria smith: CT Chest   Recommend CT Chest in 6 months.     She does have seasonal allergies   She did scuba dive late June.   She denies cough, fever   Lymph nodes felt to be reactive per radiology   She quit smoking over 18 years ago   No family history of lung cancer or personal history of cancer.

## 2025-02-11 NOTE — TELEPHONE ENCOUNTER
Pt called in to schedule but it has been more than 6 months since she went through OA program. I advised her that our  will call her back as soon as she can to go over those questions again.

## 2025-02-13 NOTE — TELEPHONE ENCOUNTER
Patient is setting up a PCP with [facility]. She is still undecided if she wants to schedule with us or maybe with [facility]. She will call after she has that appointment once she decides what she is doing.

## 2025-02-28 ENCOUNTER — HOSPITAL ENCOUNTER (OUTPATIENT)
Dept: RADIOLOGY | Facility: HOSPITAL | Age: 60
Discharge: HOME | End: 2025-02-28
Payer: COMMERCIAL

## 2025-02-28 DIAGNOSIS — R91.8 MULTIPLE LUNG NODULES ON CT: ICD-10-CM

## 2025-02-28 PROCEDURE — 71250 CT THORAX DX C-: CPT

## 2025-03-04 ENCOUNTER — TELEMEDICINE (OUTPATIENT)
Dept: PRIMARY CARE | Facility: CLINIC | Age: 60
End: 2025-03-04
Payer: COMMERCIAL

## 2025-03-04 DIAGNOSIS — R91.8 LUNG NODULES: Primary | ICD-10-CM

## 2025-03-04 PROCEDURE — 99212 OFFICE O/P EST SF 10 MIN: CPT | Performed by: NURSE PRACTITIONER

## 2025-03-04 PROCEDURE — 1036F TOBACCO NON-USER: CPT | Performed by: NURSE PRACTITIONER

## 2025-03-04 SDOH — ECONOMIC STABILITY: FOOD INSECURITY: WITHIN THE PAST 12 MONTHS, YOU WORRIED THAT YOUR FOOD WOULD RUN OUT BEFORE YOU GOT MONEY TO BUY MORE.: NEVER TRUE

## 2025-03-04 ASSESSMENT — LIFESTYLE VARIABLES
SKIP TO QUESTIONS 9-10: 1
AUDIT-C TOTAL SCORE: 0
HOW MANY STANDARD DRINKS CONTAINING ALCOHOL DO YOU HAVE ON A TYPICAL DAY: PATIENT DOES NOT DRINK
HOW OFTEN DO YOU HAVE A DRINK CONTAINING ALCOHOL: NEVER
HOW OFTEN DO YOU HAVE SIX OR MORE DRINKS ON ONE OCCASION: NEVER

## 2025-03-04 ASSESSMENT — COLUMBIA-SUICIDE SEVERITY RATING SCALE - C-SSRS
2. HAVE YOU ACTUALLY HAD ANY THOUGHTS OF KILLING YOURSELF?: NO
1. IN THE PAST MONTH, HAVE YOU WISHED YOU WERE DEAD OR WISHED YOU COULD GO TO SLEEP AND NOT WAKE UP?: NO
6. HAVE YOU EVER DONE ANYTHING, STARTED TO DO ANYTHING, OR PREPARED TO DO ANYTHING TO END YOUR LIFE?: NO

## 2025-03-04 ASSESSMENT — ENCOUNTER SYMPTOMS
LOSS OF SENSATION IN FEET: 0
DEPRESSION: 0
OCCASIONAL FEELINGS OF UNSTEADINESS: 0

## 2025-03-04 ASSESSMENT — PAIN SCALES - GENERAL: PAINLEVEL_OUTOF10: 0-NO PAIN

## 2025-03-04 NOTE — PATIENT INSTRUCTIONS
Lung nodules, minor fissural lymph node and mildly enlarged mediastinal nodes are all unchanged going back through 10 October 2022.  No new or enlarging lung nodule or evolving thoracic adenopathy.  Recommend ct chest 18-24 months to document 5 year stability.      Will follow up with PCP re: renal cysts.

## 2025-03-04 NOTE — PROGRESS NOTES
Subjective   Patient ID: Bria Pedro is a 59 y.o. female who presents for Follow-up (No complaints).  HPI 59-year-old female presents today for lung nodule clinic.    Telehealth visit between Bria Pedro and Elizabeth Hernandez CNP at [facility] lung nodule clinic per patient request.    former smoker - quit 2004  smoked on and off for 9-10 yrs - 1/2 pack a day  No personal history of cancer. No family history of lung cancer.  Patient is a  year-round.    CT chest without IV contrast dated 2/28/2025  7 mm right upper lobe nodule (today's exam series 3, image 65) is  likely benign, unchanged back through 10 October 2022;      Previously annotated 9.4 mm subpleural dependent left lower lobe  abnormality is no longer present, must have been atelectasis;      7 mm subpleural right lower lobe nodule at the posterolateral  costophrenic sulcus is benign, unchanged from as far back as CT 5 April 2022 (on today's exam series 3 image 238);      Normal minor fissural lymph node (series 3, image 150, series 6,  image 104) is not a pulmonary parenchymal nodule and furthermore is  unchanged back through 5 April 2022, does not require follow-up;      No new or enlarging nodule  CT chest without IV contrast dated 7/31/2024   There has been stability of an irregularly marginated approximate 5  mm nodule with satellite centrilobular nodularity superiorly at the  right upper lung best seen on image 59 of series 4.  5 mm minor perifissural nodule on image 153, 5 mm right major  perifissural nodule and 7 mm subpleural nodule on image 208 are  stable, and probably intrapulmonary nodes. There is a new 9 mm in  diameter pleural thickening in the left lower lung posteriorly on  image 157, probably additional reactive intrapulmonary node or  possibly developing pleural plaque.    CT chest without iv contrast dated July 13, 2023  A 6 mm nodule is noted in the right apex, image 64 of 283, possibly  partially calcified,  most likely a granuloma, unchanged. There are  tiny reticulonodular opacity just superior to this nodule which are  also unchanged. Reticular opacities also seen along the posterior  aspect of both upper lobes, unchanged. Stable nodule along the minor  fissure measuring 6 mm, image 149. No new suspicious pulmonary nodule  or mass. No focal areas of consolidation. No evidence of an effusion  or pneumothorax. Mild scattered areas of atelectasis/scarring.     CT chest without iv contrast dated 1/11/2023 and compared with CT chest 10/10/2022 and CT calcium score dated 4/5/2022.  6 mm nodule near the right apex is stable since 2 months ago, not  previously imaged on prior calcium score. Subpleural nodule  involving the right upper lobe medially is also stable. 6 mm right  minor fissural node is stable since calcium score and considered  benign. Otherwise the lungs are clear. No suspicious nodules.     No pleural effusion or pneumothorax.  Central airways are patent. No bronchiectasis.  18 mm subcarinal lymph node is stable to 04/05/2022. 8 mm  paraesophageal node is also stable since that exam. 12 mm lower right  paratracheal lymph node is stable since 10/10/2022     CT chest without iv contrast dated October 10, 2022  6 mm solid nodule in the right middle lobe adjacent to the minor  fissure (image 143 of 280) appears unchanged from the prior study. In  the right upper lobe there is a 7 mm nodule (image 59 of 280) which  is in an area not included on the prior exam. No left-sided pulmonary  nodules are noted.  Several subcentimeter mediastinal lymph nodes are noted. Level 3  lymph node has a short axis diameter of 1.2 cm. Subcarinal lymph node  has a short axis diameter of 1.5 cm.    Review of Systems  Review of systems: Present-feeling well. Not present-chills, fatigue and fever.  Respiratory: Not present-difficulty breathing, cough, bloody sputum.  Cardiovascular: Not present-chest pain, palpitations, dyspnea on  exertion.  Objective   There were no vitals taken for this visit.     Physical Exam  Gen.: Mental status-alert. Gen. appearance-cooperative, well groomed and consistent with stated age. Not in acute distress or sickly.     Assessment/Plan   Lung nodules

## 2025-03-11 ENCOUNTER — OFFICE VISIT (OUTPATIENT)
Dept: URGENT CARE | Age: 60
End: 2025-03-11
Payer: COMMERCIAL

## 2025-03-11 VITALS
TEMPERATURE: 97.6 F | DIASTOLIC BLOOD PRESSURE: 72 MMHG | HEART RATE: 94 BPM | RESPIRATION RATE: 17 BRPM | SYSTOLIC BLOOD PRESSURE: 146 MMHG | OXYGEN SATURATION: 94 %

## 2025-03-11 DIAGNOSIS — R05.9 COUGH, UNSPECIFIED TYPE: ICD-10-CM

## 2025-03-11 DIAGNOSIS — J02.9 SORE THROAT: ICD-10-CM

## 2025-03-11 LAB
POC RAPID INFLUENZA A: NEGATIVE
POC RAPID INFLUENZA B: NEGATIVE
POC RAPID STREP: NEGATIVE

## 2025-03-11 PROCEDURE — 87804 INFLUENZA ASSAY W/OPTIC: CPT | Performed by: NURSE PRACTITIONER

## 2025-03-11 PROCEDURE — 87880 STREP A ASSAY W/OPTIC: CPT | Performed by: NURSE PRACTITIONER

## 2025-03-11 PROCEDURE — 99203 OFFICE O/P NEW LOW 30 MIN: CPT | Performed by: NURSE PRACTITIONER

## 2025-03-11 ASSESSMENT — ENCOUNTER SYMPTOMS
SHORTNESS OF BREATH: 0
SWEATS: 0
RHINORRHEA: 0
MYALGIAS: 0
WEIGHT LOSS: 0
COUGH: 1
FEVER: 0
WHEEZING: 0
HEARTBURN: 1
SORE THROAT: 1
HEMOPTYSIS: 0
HEADACHES: 0
CHILLS: 0

## 2025-03-11 NOTE — PROGRESS NOTES
Subjective   Patient ID: Bria Pedro is a 59 y.o. female. They present today with a chief complaint of Cough (Sore throat, fatigue and cough for two days. ).    History of Present Illness  Covid test was negative at home      History provided by:  Patient   used: No    Cough  This is a new problem. Episode onset: 2 days ago. The problem has been unchanged. The problem occurs every few hours. The cough is Non-productive. Associated symptoms include heartburn and a sore throat. Pertinent negatives include no chest pain, chills, ear congestion, ear pain, fever, headaches, hemoptysis, myalgias, nasal congestion, postnasal drip, rash, rhinorrhea, shortness of breath, sweats, weight loss or wheezing. Associated symptoms comments: fatigue. Nothing aggravates the symptoms. Treatments tried: Numbing throat spray, ibuprofen and tylenol. The treatment provided no relief.       Past Medical History  Allergies as of 03/11/2025    (No Known Allergies)       (Not in a hospital admission)       Past Medical History:   Diagnosis Date    Other allergy status, other than to drugs and biological substances     Environmental allergies    Pure hypercholesterolemia, unspecified     High cholesterol       Past Surgical History:   Procedure Laterality Date    BILATERAL OOPHORECTOMY      MR HEAD ANGIO W AND WO IV CONTRAST  6/10/2021    MR HEAD ANGIO W AND WO IV CONTRAST 6/10/2021    OTHER SURGICAL HISTORY  07/06/2016    History Of Prior Surgery    TONSILLECTOMY  07/06/2016    Tonsillectomy    UTERINE FIBROID SURGERY          reports that she quit smoking about 21 years ago. Her smoking use included cigarettes. She has never used smokeless tobacco. She reports that she does not currently use alcohol. She reports that she does not use drugs.    Review of Systems  Review of Systems   Constitutional:  Negative for chills, fever and weight loss.   HENT:  Positive for sore throat. Negative for ear pain, postnasal drip and  rhinorrhea.    Respiratory:  Positive for cough. Negative for hemoptysis, shortness of breath and wheezing.    Cardiovascular:  Negative for chest pain.   Gastrointestinal:  Positive for heartburn.   Musculoskeletal:  Negative for myalgias.   Skin:  Negative for rash.   Neurological:  Negative for headaches.         Objective    Vitals:    03/11/25 0804   BP: 146/72   Pulse: 94   Resp: 17   Temp: 36.4 °C (97.6 °F)   TempSrc: Oral   SpO2: 94%     No LMP recorded.    Physical Exam  Vitals and nursing note reviewed.   Constitutional:       Appearance: Normal appearance.   HENT:      Head: Normocephalic and atraumatic.      Right Ear: Hearing, tympanic membrane, ear canal and external ear normal.      Left Ear: Hearing, tympanic membrane, ear canal and external ear normal.      Nose: Nose normal. No nasal deformity, septal deviation, signs of injury, laceration, nasal tenderness, mucosal edema, congestion or rhinorrhea.      Right Sinus: No maxillary sinus tenderness or frontal sinus tenderness.      Left Sinus: No maxillary sinus tenderness or frontal sinus tenderness.      Mouth/Throat:      Lips: Pink.      Mouth: Mucous membranes are moist.      Pharynx: Uvula midline. Posterior oropharyngeal erythema present.      Tonsils: No tonsillar exudate or tonsillar abscesses. 0 on the right. 0 on the left.   Cardiovascular:      Rate and Rhythm: Normal rate and regular rhythm.      Heart sounds: Normal heart sounds.   Pulmonary:      Effort: Pulmonary effort is normal.      Breath sounds: Normal breath sounds and air entry.   Musculoskeletal:      Cervical back: Normal range of motion and neck supple.   Lymphadenopathy:      Cervical: No cervical adenopathy.   Neurological:      Mental Status: She is alert.   Psychiatric:         Mood and Affect: Mood normal.         Behavior: Behavior normal.         Procedures    Point of Care Test & Imaging Results from this visit  Results for orders placed or performed in visit on  03/11/25   POCT Influenza A/B manually resulted   Result Value Ref Range    POC Rapid Influenza A Negative Negative    POC Rapid Influenza B Negative Negative   POCT rapid strep A manually resulted   Result Value Ref Range    POC Rapid Strep Negative Negative      No results found.    Diagnostic study results (if any) were reviewed by SANTOS Quintanilla.    Assessment/Plan   Allergies, medications, history, and pertinent labs/EKGs/Imaging reviewed by SANTOS Quintanilla.     Medical Decision Making  Supportive care of viral pharyngitis discussed.  Symptoms should improve in 7 to 10 days.  Increase fluid intake and rest as needed.  Take Tylenol and/or ibuprofen as needed for aches and pain and/or fever.  Return or follow-up with primary care provider if symptoms did not improve and/or pt developed purulent nasal discharge, worsening cough, fever, worsening sore throat, ear pain, sinus pain and headaches.  Call 911 or go to the nearest emergency room if symptoms became severe such as severe pain, shortness of breath, chest tight ness.   Patient verbalized understanding of the instructions and left in stable condition.        Orders and Diagnoses  Diagnoses and all orders for this visit:  Cough, unspecified type  -     POCT Influenza A/B manually resulted  Sore throat  -     POCT rapid strep A manually resulted      Medical Admin Record      Patient disposition: Home    Electronically signed by SANTOS Quintanilla  9:05 AM

## 2025-05-08 ENCOUNTER — APPOINTMENT (OUTPATIENT)
Dept: PRIMARY CARE | Facility: CLINIC | Age: 60
End: 2025-05-08
Payer: COMMERCIAL